# Patient Record
Sex: MALE | Race: WHITE
[De-identification: names, ages, dates, MRNs, and addresses within clinical notes are randomized per-mention and may not be internally consistent; named-entity substitution may affect disease eponyms.]

---

## 2021-01-29 ENCOUNTER — HOSPITAL ENCOUNTER (EMERGENCY)
Dept: HOSPITAL 96 - M.ERS | Age: 61
Discharge: HOME | End: 2021-01-29
Payer: OTHER GOVERNMENT

## 2021-01-29 VITALS — DIASTOLIC BLOOD PRESSURE: 70 MMHG | SYSTOLIC BLOOD PRESSURE: 124 MMHG

## 2021-01-29 VITALS — HEIGHT: 71 IN | WEIGHT: 164.99 LBS | BODY MASS INDEX: 23.1 KG/M2

## 2021-01-29 DIAGNOSIS — Z90.49: ICD-10-CM

## 2021-01-29 DIAGNOSIS — I10: ICD-10-CM

## 2021-01-29 DIAGNOSIS — E11.9: ICD-10-CM

## 2021-01-29 DIAGNOSIS — Z20.828: Primary | ICD-10-CM

## 2021-01-29 DIAGNOSIS — E78.00: ICD-10-CM

## 2021-11-23 ENCOUNTER — HOSPITAL ENCOUNTER (OUTPATIENT)
Dept: HOSPITAL 96 - M.CRD | Age: 61
End: 2021-11-23
Attending: INTERNAL MEDICINE
Payer: OTHER GOVERNMENT

## 2021-11-23 DIAGNOSIS — I35.8: Primary | ICD-10-CM

## 2021-11-23 DIAGNOSIS — I25.10: ICD-10-CM

## 2021-11-23 DIAGNOSIS — I50.9: ICD-10-CM

## 2021-11-23 NOTE — 2DMMODE
[place] [address]
Phone:  [phone] 2 D/M-MODE ECHOCARDIOGRAM     
_______________________________________________________________________________
 
Name:         YOJOSE CATALINO            Room:                     REG CLI
M.R.#:    [id number]     Account #:     [id number]  
Admission:    21    Attend Phys:   Roya HUDSON
Discharge:                Date of Birth: [birth year]  
Date of Service: 21 1123  Report #:      [id number]
        [id number]
_______________________________________________________________________________
THIS REPORT FOR:
 
cc:  [facility] physician unknown
     [facility] physician unknown
     Chandana Crowder MD [facility]        
                                                                       ~
 
--------------- APPROVED REPORT --------------
 
 
Study performed:  2021 11:04:15
 
EXAM: Comprehensive 2D, Doppler, and color-flow 
Echocardiogram 
Patient Location: Out-Patient   
 
      BSA:         2.17
HR: 80 bpm BP:          135/80 mmHg 
 
Other Information 
Study Quality: Good
 
Indications
CAD
 
2D Dimensions
IVSd:  10.26 (7-11mm) LVOT Diam:  20.66 (18-24mm) 
LVDd:  45.37 mm  
PWd:  10.50 (7-11mm) Ascending Ao:  31.54 (22-36mm)
LVDs:  21.28 (25-40mm) 
Aortic Root:  33.15 mm 
 
Volumes
Left Atrial Volume (Systole) 
    LA ESV Index:  18.50 mL/m2
 
Aortic Valve
AoV Peak Harish.:  1.34 m/s 
AO Peak Gr.:  7.23 mmHg  LVOT Max P.66 mmHg
AO Mean Gr.:  3.89 mmHg  LVOT Mean PG:  3.08 mmHg
    LVOT Max V:  1.38 m/s
AO V2 VTI:  28.50 cm  LVOT Mean V:  0.78 m/s
MARIO (VTI):  3.35 cm2  LVOT V1 VTI:  28.48 cm
 
Mitral Valve
    E/A Ratio:  0.92
 
 
[place] [address]
Phone:  [phone]                     2 D/M-MODE ECHOCARDIOGRAM     
_______________________________________________________________________________
 
Name:         JOSE RAM            Room:                     REG CLI
M.R.#:    [id number]     Account #:     [id number]  
Admission:    21    Attend Phys:   Roya HUDSON
Discharge:                Date of Birth: [birth year]  
Date of Service: 21 1123  Report #:      [id number]
        [id number]
_______________________________________________________________________________
    MV Decel. Time:  221.70 ms
MV E Max Harish.:  0.88 m/s 
MV PHT:  64.29 ms  
MVA (PHT):  3.42 cm2  
 
TDI
E/Lateral E':  8.80 E/Medial E':  9.78
   Medial E' Harish.:  0.09 m/s
   Lateral E' Harish.:  0.10 m/s
 
Pulmonary Valve
PV Peak Harish.:  1.23 m/s PV Peak Gr.:  6.01 mmHg
 
Left Ventricle
The left ventricle is normal size. There is normal LV segmental wall 
motion. There is normal left ventricular wall thickness. Left 
ventricular systolic function is normal. The left ventricular 
ejection fraction is within the normal range. LVEF is 55-60%. Grade I 
- abnormal relaxation pattern.
 
Right Ventricle
The right ventricle is normal size. The right ventricular systolic 
function is normal.
 
Atria
The left atrium size is normal. The right atrium size is 
normal.
 
Aortic Valve
The Aortic valve is sclerotic. No aortic regurgitation is present. 
There is no aortic valvular stenosis.
 
Mitral Valve
The mitral valve is normal in structure. There is no mitral valve 
regurgitation noted. No evidence of mitral valve stenosis.
 
Tricuspid Valve
The tricuspid valve is normal in structure. There is no tricuspid 
valve regurgitation noted.
 
Pulmonic Valve
The pulmonary valve is normal in structure. There is no pulmonic 
valvular regurgitation.
 
Great Vessels
The aortic root is normal in size. IVC is normal in size and 
 
 
[place] [address]
Phone:  [phone]                     2 D/M-MODE ECHOCARDIOGRAM     
_______________________________________________________________________________
 
Name:         JOSE RAM            Room:                     REG CLI
M.R.#:    [id number]     Account #:     [id number]  
Admission:    21    Attend Phys:   Roya HUDSON
Discharge:                Date of Birth: [birth year]  
Date of Service: 21 1123  Report #:      [id number]
        [id number]
_______________________________________________________________________________
collapses >50% with inspiration.
 
Pericardium
There is no pericardial effusion.
 
<Conclusion>
LVEF is 55-60%.
The Aortic valve is sclerotic.
 
 
 
 
 
 
 
 
 
 
 
 
 
 
 
 
 
 
 
 
 
 
 
 
 
 
 
 
 
 
 
 
 
 
 
 
  <ELECTRONICALLY SIGNED>
                                           By: Chandana Crowder MD, FAC      
  21     1123
D: 21   _____________________________________
T: 21   Chandana Crowder MD, [facility]        /INF

## 2022-01-04 ENCOUNTER — HOSPITAL ENCOUNTER (EMERGENCY)
Dept: HOSPITAL 96 - M.ERS | Age: 62
Discharge: HOME | End: 2022-01-04
Payer: OTHER GOVERNMENT

## 2022-01-04 ENCOUNTER — HOSPITAL ENCOUNTER (EMERGENCY)
Dept: HOSPITAL 96 - M.ERS | Age: 62
Discharge: LEFT BEFORE DISCHARGE | End: 2022-01-04
Payer: OTHER GOVERNMENT

## 2022-01-04 VITALS — WEIGHT: 220 LBS | HEIGHT: 69 IN | BODY MASS INDEX: 32.58 KG/M2

## 2022-01-04 VITALS — DIASTOLIC BLOOD PRESSURE: 75 MMHG | SYSTOLIC BLOOD PRESSURE: 139 MMHG

## 2022-01-04 DIAGNOSIS — Z53.21: ICD-10-CM

## 2022-01-04 DIAGNOSIS — Z90.89: ICD-10-CM

## 2022-01-04 DIAGNOSIS — I10: ICD-10-CM

## 2022-01-04 DIAGNOSIS — M54.50: Primary | ICD-10-CM

## 2022-01-04 DIAGNOSIS — I25.2: ICD-10-CM

## 2022-01-04 DIAGNOSIS — Z79.84: ICD-10-CM

## 2022-01-04 DIAGNOSIS — F17.210: ICD-10-CM

## 2022-01-04 DIAGNOSIS — E78.00: ICD-10-CM

## 2022-01-04 DIAGNOSIS — Z79.899: ICD-10-CM

## 2022-01-04 DIAGNOSIS — Z79.51: ICD-10-CM

## 2022-01-04 DIAGNOSIS — E11.9: ICD-10-CM

## 2022-01-04 DIAGNOSIS — Z79.891: ICD-10-CM
